# Patient Record
Sex: FEMALE | Race: WHITE | HISPANIC OR LATINO | URBAN - METROPOLITAN AREA
[De-identification: names, ages, dates, MRNs, and addresses within clinical notes are randomized per-mention and may not be internally consistent; named-entity substitution may affect disease eponyms.]

---

## 2018-07-02 ENCOUNTER — OFFICE VISIT (OUTPATIENT)
Dept: URBAN - METROPOLITAN AREA CLINIC 11 | Facility: CLINIC | Age: 73
End: 2018-07-02
Payer: COMMERCIAL

## 2018-07-02 DIAGNOSIS — H40.053 OCULAR HYPERTENSION, BILATERAL: Primary | ICD-10-CM

## 2018-07-02 DIAGNOSIS — E11.3552 TYPE 2 DIABETES WITH STABLE PROLIF DIABETIC RTNOP, LEFT EYE: ICD-10-CM

## 2018-07-02 DIAGNOSIS — H04.123 DRY EYE SYNDROME OF BILATERAL LACRIMAL GLANDS: ICD-10-CM

## 2018-07-02 PROCEDURE — 92014 COMPRE OPH EXAM EST PT 1/>: CPT | Performed by: OPTOMETRIST

## 2018-07-02 PROCEDURE — 92133 CPTRZD OPH DX IMG PST SGM ON: CPT | Performed by: OPTOMETRIST

## 2018-07-02 ASSESSMENT — INTRAOCULAR PRESSURE
OS: 18
OD: 14

## 2018-07-02 NOTE — IMPRESSION/PLAN
Impression: Ocular hypertension, bilateral: H40.053. Plan: OCT of RNFL ordered and performed today. RNFL normal OU. IOP at good level today ou. Monitor without drops for now.  
f/u 3mns IOP

## 2018-07-02 NOTE — IMPRESSION/PLAN
Impression: Type 2 diabetes with stable prolif diabetic rtnop, left eye: P43.6271. OS. Condition: established, stable. Plan: continue BS control.  RTC 3 mns DE OCT

## 2018-10-02 ENCOUNTER — OFFICE VISIT (OUTPATIENT)
Dept: URBAN - METROPOLITAN AREA CLINIC 11 | Facility: CLINIC | Age: 73
End: 2018-10-02
Payer: COMMERCIAL

## 2018-10-02 DIAGNOSIS — E11.3553 TYPE 2 DIABETES WITH STABLE PROLIF DIABETIC RTNOP, BILATERAL: Primary | ICD-10-CM

## 2018-10-02 PROCEDURE — 92014 COMPRE OPH EXAM EST PT 1/>: CPT | Performed by: OPTOMETRIST

## 2018-10-02 PROCEDURE — 92134 CPTRZ OPH DX IMG PST SGM RTA: CPT | Performed by: OPTOMETRIST

## 2018-10-02 ASSESSMENT — INTRAOCULAR PRESSURE
OS: 22
OD: 22

## 2018-10-02 NOTE — IMPRESSION/PLAN
Impression: Type 2 diabetes with stable prolif diabetic rtnop, bilateral: [id number]. Plan: OCT of macula ordered and performed today ou. No DME OU. No signs of new neovascularization noted. No treatment necessary at this time. Patient was instructed to monitor vision for sudden changes and to call if visual changes noted. Discussed ocular and systemic benefits of blood sugar control.  RTC 6mns DE

## 2018-10-02 NOTE — IMPRESSION/PLAN
Impression: Ocular hypertension, bilateral: H40.053. Condition: stable. Plan: Monitor without drops for now.  
f/u 6mns IOP,DE,OCT

## 2019-04-02 ENCOUNTER — OFFICE VISIT (OUTPATIENT)
Dept: URBAN - METROPOLITAN AREA CLINIC 11 | Facility: CLINIC | Age: 74
End: 2019-04-02
Payer: COMMERCIAL

## 2019-04-02 DIAGNOSIS — E11.3593 TYPE 2 DIABETES MELLITUS W/ PROLIFERATIVE DIABETIC RETINOPATHY W/O MACULAR EDEMA, BILATERAL: Primary | ICD-10-CM

## 2019-04-02 PROCEDURE — 92014 COMPRE OPH EXAM EST PT 1/>: CPT | Performed by: OPTOMETRIST

## 2019-04-02 PROCEDURE — 92134 CPTRZ OPH DX IMG PST SGM RTA: CPT | Performed by: OPTOMETRIST

## 2019-04-02 RX ORDER — OMEPRAZOLE 40 MG/1
40 MG CAPSULE, DELAYED RELEASE PELLETS ORAL
Qty: 0 | Refills: 0 | Status: ACTIVE
Start: 2019-04-02

## 2019-04-02 ASSESSMENT — INTRAOCULAR PRESSURE
OD: 19
OS: 19

## 2019-04-02 NOTE — IMPRESSION/PLAN
Impression: Type 2 diabetes mellitus w/ proliferative diabetic retinopathy w/o macular edema, bilateral: F91.0221. Plan: OCT of macula ordered and performed today ou. No DME OU. Signs of neovascularization noted. Will refer patient for a retinal consult and tx with retinal specialist.  Discussed ocular and systemic benefits of maintaining blood sugar control.  RTC 1-2wks DE

## 2019-04-11 ENCOUNTER — OFFICE VISIT (OUTPATIENT)
Dept: URBAN - METROPOLITAN AREA CLINIC 11 | Facility: CLINIC | Age: 74
End: 2019-04-11
Payer: COMMERCIAL

## 2019-04-11 PROCEDURE — 92134 CPTRZ OPH DX IMG PST SGM RTA: CPT | Performed by: OPHTHALMOLOGY

## 2019-04-11 PROCEDURE — 99213 OFFICE O/P EST LOW 20 MIN: CPT | Performed by: OPHTHALMOLOGY

## 2019-04-11 ASSESSMENT — INTRAOCULAR PRESSURE
OD: 17
OS: 18

## 2019-04-11 NOTE — IMPRESSION/PLAN
Impression: Type 2 diab with prolif diab rtnop without macular edema, bi: [id number]. OU. Plan: OCT ordered and performed today. The clinical exam is consistent with proliferative diabetic retinopathy. Discussed diagnosis with patient. Recommend close observation at this time. Discussed risk of progression with the present condition. The patient was advised to maintain tight blood sugar control, blood pressure and lipid control. Patient agrees with plan.

## 2021-12-06 ENCOUNTER — OFFICE VISIT (OUTPATIENT)
Dept: URBAN - METROPOLITAN AREA CLINIC 11 | Facility: CLINIC | Age: 76
End: 2021-12-06
Payer: COMMERCIAL

## 2021-12-06 PROCEDURE — 92134 CPTRZ OPH DX IMG PST SGM RTA: CPT | Performed by: OPTOMETRIST

## 2021-12-06 PROCEDURE — 99214 OFFICE O/P EST MOD 30 MIN: CPT | Performed by: OPTOMETRIST

## 2021-12-06 ASSESSMENT — INTRAOCULAR PRESSURE
OS: 19
OD: 18

## 2021-12-06 ASSESSMENT — KERATOMETRY
OS: 43.88
OD: 44.63

## 2021-12-06 NOTE — IMPRESSION/PLAN
Impression: Type 2 diab with prolif diab rtnop without macular edema, bi: Q66.1274. OU. Plan: OCT of macula ordered and performed today ou. No DME OU. Signs of neovascularization noted OD. Will refer patient for a retinal consult and tx with retinal specialist.  Discussed ocular and systemic benefits of maintaining blood sugar control.  RTC 1-2wks DE

## 2021-12-20 ENCOUNTER — OFFICE VISIT (OUTPATIENT)
Dept: URBAN - METROPOLITAN AREA CLINIC 11 | Facility: CLINIC | Age: 76
End: 2021-12-20
Payer: COMMERCIAL

## 2021-12-20 PROCEDURE — 99213 OFFICE O/P EST LOW 20 MIN: CPT | Performed by: OPHTHALMOLOGY

## 2021-12-20 PROCEDURE — 92134 CPTRZ OPH DX IMG PST SGM RTA: CPT | Performed by: OPHTHALMOLOGY

## 2021-12-20 ASSESSMENT — INTRAOCULAR PRESSURE
OS: 20
OD: 22

## 2021-12-20 NOTE — IMPRESSION/PLAN
Impression: Type 2 diabetes mellitus with proliferative diabetic retinopathy without macular edema, bilateral: E99.0809. Bilateral.
ischemic retinas Plan: OCT ordered and performed today. The clinical exam is consistent with proliferative diabetic retinopathy. Discussed diagnosis with patient. Recommend close observation at this time. Discussed risk of progression with the present condition. The patient was advised to maintain tight blood sugar control, blood pressure and lipid control. Patient agrees with plan. Discussed the retinas are ischemic and no current treatment for at this time.